# Patient Record
Sex: FEMALE | Race: WHITE
[De-identification: names, ages, dates, MRNs, and addresses within clinical notes are randomized per-mention and may not be internally consistent; named-entity substitution may affect disease eponyms.]

---

## 2023-06-23 ENCOUNTER — HOSPITAL ENCOUNTER (EMERGENCY)
Age: 33
Discharge: HOME | End: 2023-06-23
Payer: MEDICAID

## 2023-06-23 VITALS
DIASTOLIC BLOOD PRESSURE: 66 MMHG | RESPIRATION RATE: 20 BRPM | OXYGEN SATURATION: 99 % | SYSTOLIC BLOOD PRESSURE: 92 MMHG | HEART RATE: 73 BPM

## 2023-06-23 VITALS — BODY MASS INDEX: 24.9 KG/M2

## 2023-06-23 DIAGNOSIS — F17.210: ICD-10-CM

## 2023-06-23 DIAGNOSIS — K08.89: ICD-10-CM

## 2023-06-23 DIAGNOSIS — K02.9: Primary | ICD-10-CM

## 2023-06-23 PROCEDURE — 99283 EMERGENCY DEPT VISIT LOW MDM: CPT

## 2023-06-23 NOTE — ED_ITS
HPI - General Adult    
General    
Chief complaint: Dental/Oral    
Stated complaint: DENTAL PAIN    
Time Seen by Provider: 06/23/23 19:06    
Source: patient    
Mode of arrival: walk-in    
Limitations: no limitations    
History of Present Illness    
HPI narrative:     
32-year-old presents with dental pain left posterior tooth. Patient has chronic   
decay and dental caries. Chronic pain noted to dentition #17. No acute abscess   
appreciated. States pain radiates to her ear. She is afebrile.    
Related Data    
                                  Previous Rx's    
    
    
    
 Medication  Instructions  Recorded    
     
clindamycin HCl 300 mg capsule 300 mg PO BID 7 days #14 caps 06/23/23    
     
ibuprofen 600 mg tablet 600 mg PO Q8H PRN fever or pain 06/23/23    
    
 #20 tabs     
    
    
    
                                    Allergies    
    
    
    
Allergy/AdvReac Type Severity Reaction Status Date / Time    
     
No Known Drug Allergies Allergy   Verified 06/23/23 19:21    
    
    
    
    
Review of Systems    
    
    
ROS      
    
 Narrative All Systems are negative except as noted/marked.All systems reviewed   
and otherwise negative       
    
    
PFSH    
PFSH    
Social History    
Smoking status:  Current every day smoker     
    
    
    
Exam    
Narrative    
Exam Narrative:     
N    
    
General: The patient is comfortable, alert and oriented x3, well appearing, non   
toxic in no apparent distress.    
Head: Atraumatic and normocephalic.    
Eyes: Normal conjunctiva, no exudates.    
ENT: The oropharynx is normal. No pharyngeal erythema, uvular edema, tonsillar   
exudates, asymmetry or trismus. Uvula is midline.  Mouth is normal to inspection  
 With the exception of a pain on percussion of the tooth #17 and evidence of   
dental caries. There is no evidence of facial asymmetry or abscess formation.   
Floor of the mouth is soft. No tenderness in the submental or submandibular   
space. No tongue elevation or deviation. The patient has no evidence of   
periapical abscess, gingivitis, ANUG or other acute pathology.  Airway is   
patent.    
Neck: The neck demonstrates normal range of motion.  No meningeals signs are   
present. No stridor.  No masses or lymphandenopathy noted.    
Respiratory: No acute distress, lungs are clear to auscultation, no wheezing,   
rhonchi, or rales noted. No stridor or retractions are noted.    
Cardiovascular: Regular rate and rhythm    
Skin: The skin exam shows no evidence of rashes    
Neuro: Alert and oriented x4, normal speech    
Lymphatic: No cervical lymphadenopathy     
    
Constitutional    
    
                               Vital Signs - 24 hr    
    
    
    
 06/23/23    
19:17    
     
Pulse Rate [Monitor] 73    
     
Respiratory Rate 20    
     
Blood Pressure [Left Arm] 92/66    
     
Pulse Oximetry 99    
     
Oxygen Delivery Method Room Air    
    
    
    
    
    
Course    
Vital Signs    
Vital signs:     
    
                                   Vital Signs    
    
    
    
Pulse Rate  73   06/23/23 19:17    
     
Respiratory Rate  20   06/23/23 19:17    
     
Blood Pressure  92/66   06/23/23 19:17    
     
Pulse Oximetry  99   06/23/23 19:17    
     
Oxygen Delivery Method  Room Air  06/23/23 19:17    
    
    
                                            
    
    
    
Pulse Rate  73   06/23/23 19:17    
     
Respiratory Rate  20   06/23/23 19:17    
     
Blood Pressure  92/66   06/23/23 19:17    
     
Pulse Oximetry  99   06/23/23 19:17    
     
Oxygen Delivery Method  Room Air  06/23/23 19:17    
    
    
    
    
    
Medical Decision Making    
MDM Narrative    
Medical decision making narrative:     
Patient's examination is consistent with dental caries, dental pain discharged   
home prescription of antibiotics and Motrin. Follow-up with primary care   
physician or dentist. Given dental list.    
Differential Diagnosis    
Differential Diagnosis: Pain, dental abscess, dental caries    
Medical Records    
Medical records reviewed: Yes I reviewed the patient's medical records    
    
Discharge Plan    
Discharge    
Chief Complaint: Dental/Oral    
    
Clinical Impression:    
 Dental caries, Toothache    
    
    
Patient Disposition: Home, Self-Care    
    
Time of Disposition Decision: 19:24    
    
Condition: Good    
    
Prescriptions / Home Meds:    
New    
  clindamycin HCl 300 mg capsule     
   300 mg PO BID 7 Days Qty: 14 0RF    
  ibuprofen 600 mg tablet     
   600 mg PO Q8H PRN (Reason: fever or pain) Qty: 20 0RF    
    
Instructions:  Toothache (ED)    
    
Stand Alone Forms:  Portal Instructions    
    
Referrals:    
JULI SANTIAGO [Primary Care Provider] - 1 week    
    
Discharge Date/Time: 06/23/23 20:04

## 2023-10-19 ENCOUNTER — APPOINTMENT (OUTPATIENT)
Dept: GENERAL RADIOLOGY | Age: 33
End: 2023-10-19
Payer: MEDICAID

## 2023-10-19 ENCOUNTER — HOSPITAL ENCOUNTER (EMERGENCY)
Age: 33
Discharge: HOME | End: 2023-10-19
Attending: EMERGENCY MEDICINE
Payer: MEDICAID

## 2023-10-19 VITALS
WEIGHT: 149.5 LBS | DIASTOLIC BLOOD PRESSURE: 82 MMHG | OXYGEN SATURATION: 99 % | RESPIRATION RATE: 22 BRPM | SYSTOLIC BLOOD PRESSURE: 111 MMHG | HEART RATE: 120 BPM | HEIGHT: 68 IN | BODY MASS INDEX: 22.66 KG/M2 | TEMPERATURE: 98 F

## 2023-10-19 DIAGNOSIS — R07.81 PLEURODYNIA: Primary | ICD-10-CM

## 2023-10-19 LAB
ANION GAP SERPL CALCULATED.3IONS-SCNC: 13 MMOL/L (ref 9–17)
BASOPHILS # BLD: 0.03 K/UL (ref 0–0.2)
BASOPHILS NFR BLD: 0 % (ref 0–2)
BUN SERPL-MCNC: 14 MG/DL (ref 6–20)
BUN/CREAT SERPL: 20 (ref 9–20)
CALCIUM SERPL-MCNC: 9.5 MG/DL (ref 8.6–10.4)
CHLORIDE SERPL-SCNC: 102 MMOL/L (ref 98–107)
CO2 SERPL-SCNC: 25 MMOL/L (ref 20–31)
CREAT SERPL-MCNC: 0.7 MG/DL (ref 0.5–0.9)
D DIMER PPP FEU-MCNC: <0.27 UG/ML FEU (ref 0–0.59)
EOSINOPHIL # BLD: 0.1 K/UL (ref 0–0.4)
EOSINOPHILS RELATIVE PERCENT: 1 % (ref 0–5)
ERYTHROCYTE [DISTWIDTH] IN BLOOD BY AUTOMATED COUNT: 12.3 % (ref 12.1–15.2)
GFR SERPL CREATININE-BSD FRML MDRD: >60 ML/MIN/1.73M2
GLUCOSE SERPL-MCNC: 115 MG/DL (ref 70–99)
HCT VFR BLD AUTO: 39.1 % (ref 36–46)
HGB BLD-MCNC: 14.2 G/DL (ref 12–16)
IMM GRANULOCYTES # BLD AUTO: 0.01 K/UL (ref 0–0.3)
IMM GRANULOCYTES NFR BLD: 0 % (ref 0–5)
LYMPHOCYTES NFR BLD: 2.88 K/UL (ref 1–4.8)
LYMPHOCYTES RELATIVE PERCENT: 31 % (ref 15–40)
MCH RBC QN AUTO: 30.3 PG (ref 26–34)
MCHC RBC AUTO-ENTMCNC: 36.3 G/DL (ref 31–37)
MCV RBC AUTO: 83.4 FL (ref 80–100)
MONOCYTES NFR BLD: 0.81 K/UL (ref 0–1)
MONOCYTES NFR BLD: 9 % (ref 4–8)
NEUTROPHILS NFR BLD: 59 % (ref 47–75)
NEUTS SEG NFR BLD: 5.6 K/UL (ref 2.5–7)
PLATELET # BLD AUTO: 225 K/UL (ref 140–450)
PMV BLD AUTO: 10.2 FL (ref 6–12)
POTASSIUM SERPL-SCNC: 3.6 MMOL/L (ref 3.7–5.3)
RBC # BLD AUTO: 4.69 M/UL (ref 4–5.2)
SODIUM SERPL-SCNC: 140 MMOL/L (ref 135–144)
TROPONIN I SERPL HS-MCNC: <6 NG/L (ref 0–14)
WBC OTHER # BLD: 9.4 K/UL (ref 3.5–11)

## 2023-10-19 PROCEDURE — 6360000002 HC RX W HCPCS: Performed by: EMERGENCY MEDICINE

## 2023-10-19 PROCEDURE — 99285 EMERGENCY DEPT VISIT HI MDM: CPT

## 2023-10-19 PROCEDURE — 94640 AIRWAY INHALATION TREATMENT: CPT

## 2023-10-19 PROCEDURE — 71045 X-RAY EXAM CHEST 1 VIEW: CPT

## 2023-10-19 PROCEDURE — 94664 DEMO&/EVAL PT USE INHALER: CPT

## 2023-10-19 PROCEDURE — 6370000000 HC RX 637 (ALT 250 FOR IP): Performed by: EMERGENCY MEDICINE

## 2023-10-19 PROCEDURE — 80048 BASIC METABOLIC PNL TOTAL CA: CPT

## 2023-10-19 PROCEDURE — 84484 ASSAY OF TROPONIN QUANT: CPT

## 2023-10-19 PROCEDURE — 93005 ELECTROCARDIOGRAM TRACING: CPT | Performed by: EMERGENCY MEDICINE

## 2023-10-19 PROCEDURE — 85025 COMPLETE CBC W/AUTO DIFF WBC: CPT

## 2023-10-19 PROCEDURE — 85379 FIBRIN DEGRADATION QUANT: CPT

## 2023-10-19 RX ORDER — BUSPIRONE HYDROCHLORIDE 30 MG/1
30 TABLET ORAL DAILY
COMMUNITY

## 2023-10-19 RX ORDER — ALBUTEROL SULFATE 2.5 MG/3ML
2.5 SOLUTION RESPIRATORY (INHALATION) ONCE
Status: COMPLETED | OUTPATIENT
Start: 2023-10-19 | End: 2023-10-19

## 2023-10-19 RX ORDER — BUPROPION HYDROCHLORIDE 300 MG/1
300 TABLET ORAL 3 TIMES DAILY
COMMUNITY

## 2023-10-19 RX ORDER — PANTOPRAZOLE SODIUM 20 MG/1
20 TABLET, DELAYED RELEASE ORAL DAILY
Qty: 30 TABLET | Refills: 0 | Status: SHIPPED | OUTPATIENT
Start: 2023-10-19

## 2023-10-19 RX ADMIN — ALBUTEROL SULFATE 2.5 MG: 2.5 SOLUTION RESPIRATORY (INHALATION) at 19:11

## 2023-10-19 RX ADMIN — Medication: at 19:24

## 2023-10-19 ASSESSMENT — PAIN SCALES - GENERAL: PAINLEVEL_OUTOF10: 10

## 2023-10-19 ASSESSMENT — PAIN DESCRIPTION - ORIENTATION: ORIENTATION: MID

## 2023-10-19 ASSESSMENT — LIFESTYLE VARIABLES
HOW OFTEN DO YOU HAVE A DRINK CONTAINING ALCOHOL: NEVER
HOW MANY STANDARD DRINKS CONTAINING ALCOHOL DO YOU HAVE ON A TYPICAL DAY: PATIENT DOES NOT DRINK

## 2023-10-19 ASSESSMENT — PAIN - FUNCTIONAL ASSESSMENT: PAIN_FUNCTIONAL_ASSESSMENT: 0-10

## 2023-10-19 ASSESSMENT — PAIN DESCRIPTION - LOCATION: LOCATION: CHEST

## 2023-10-19 ASSESSMENT — PAIN DESCRIPTION - DESCRIPTORS: DESCRIPTORS: SHARP

## 2023-10-19 ASSESSMENT — PAIN DESCRIPTION - FREQUENCY: FREQUENCY: CONTINUOUS

## 2023-10-19 ASSESSMENT — PAIN DESCRIPTION - PAIN TYPE: TYPE: ACUTE PAIN

## 2023-10-19 NOTE — ED PROVIDER NOTES
[address] [facility],[address] COMPLAINT    Chief Complaint   Patient presents with    Chest Pain     Chest pain, SOB x2 days \"I feel like my throat is closing\" \"I have been cleaning black mold and I have asthma\"        HPI    Natalio Jim is a 28 y.o. female who presentsto ED with chest pain started 2 days ago. Patient states that she has chest pain worse with deep inspiration. Patient states that she has been \"cleaning black mold\"  Patient also has anxiety  PAST MEDICAL HISTORY    Past Medical History:   Diagnosis Date    Asthma        SURGICAL HISTORY    History reviewed. No pertinent surgical history. CURRENT MEDICATIONS    Current Outpatient Rx   Medication Sig Dispense Refill    busPIRone (BUSPAR) 30 MG tablet Take 30 mg by mouth daily      buPROPion (WELLBUTRIN XL) 300 MG extended release tablet Take 1 tablet by mouth in the morning, at noon, and at bedtime      pantoprazole (PROTONIX) 20 MG tablet Take 1 tablet by mouth daily 30 tablet 0       ALLERGIES    No Known Allergies    FAMILY HISTORY    History reviewed. No pertinent family history.     SOCIAL HISTORY    Social History     Socioeconomic History    Marital status:      Spouse name: None    Number of children: None    Years of education: None    Highest education level: None   Tobacco Use    Smoking status: Every Day     Packs/day: 1     Types: Cigarettes    Smokeless tobacco: Former   Substance and Sexual Activity    Alcohol use: Not Currently    Drug use: Not Currently     Types: IV           Review of Systems:  Constitutional:  Denies fever, chills, weight loss or weakness   Eyes:  Denies photophobia or discharge   HENT:  Denies sore throat or ear pain   Respiratory:  Denies cough or shortness of breath   Cardiovascular:  Denies chest pain, palpitations or swelling   GI:  Denies abdominal pain, nausea, vomiting, or diarrhea   Musculoskeletal:  Denies back pain   Skin:  Denies rash   Neurologic:  Denies headache, focal

## 2023-10-20 LAB
EKG ATRIAL RATE: 103 BPM
EKG P AXIS: 59 DEGREES
EKG P-R INTERVAL: 152 MS
EKG Q-T INTERVAL: 328 MS
EKG QRS DURATION: 90 MS
EKG QTC CALCULATION (BAZETT): 429 MS
EKG R AXIS: 69 DEGREES
EKG T AXIS: 39 DEGREES
EKG VENTRICULAR RATE: 103 BPM

## 2023-10-20 PROCEDURE — 93010 ELECTROCARDIOGRAM REPORT: CPT | Performed by: INTERNAL MEDICINE

## 2023-11-01 ENCOUNTER — HOSPITAL ENCOUNTER (EMERGENCY)
Age: 33
Discharge: HOME | End: 2023-11-01
Attending: EMERGENCY MEDICINE
Payer: MEDICAID

## 2023-11-01 VITALS
SYSTOLIC BLOOD PRESSURE: 97 MMHG | BODY MASS INDEX: 22.91 KG/M2 | HEART RATE: 78 BPM | TEMPERATURE: 98.2 F | RESPIRATION RATE: 16 BRPM | DIASTOLIC BLOOD PRESSURE: 69 MMHG | OXYGEN SATURATION: 99 % | HEIGHT: 68 IN | WEIGHT: 151.2 LBS

## 2023-11-01 DIAGNOSIS — R51.9 ACUTE NONINTRACTABLE HEADACHE, UNSPECIFIED HEADACHE TYPE: Primary | ICD-10-CM

## 2023-11-01 PROCEDURE — 99284 EMERGENCY DEPT VISIT MOD MDM: CPT

## 2023-11-01 PROCEDURE — 6360000002 HC RX W HCPCS: Performed by: EMERGENCY MEDICINE

## 2023-11-01 PROCEDURE — 96372 THER/PROPH/DIAG INJ SC/IM: CPT

## 2023-11-01 RX ORDER — DIPHENHYDRAMINE HYDROCHLORIDE 50 MG/ML
25 INJECTION INTRAMUSCULAR; INTRAVENOUS ONCE
Status: COMPLETED | OUTPATIENT
Start: 2023-11-01 | End: 2023-11-01

## 2023-11-01 RX ORDER — KETOROLAC TROMETHAMINE 30 MG/ML
30 INJECTION, SOLUTION INTRAMUSCULAR; INTRAVENOUS ONCE
Status: COMPLETED | OUTPATIENT
Start: 2023-11-01 | End: 2023-11-01

## 2023-11-01 RX ORDER — PROCHLORPERAZINE EDISYLATE 5 MG/ML
10 INJECTION INTRAMUSCULAR; INTRAVENOUS ONCE
Status: COMPLETED | OUTPATIENT
Start: 2023-11-01 | End: 2023-11-01

## 2023-11-01 RX ADMIN — KETOROLAC TROMETHAMINE 30 MG: 30 INJECTION, SOLUTION INTRAMUSCULAR; INTRAVENOUS at 23:00

## 2023-11-01 RX ADMIN — DIPHENHYDRAMINE HYDROCHLORIDE 25 MG: 50 INJECTION INTRAMUSCULAR; INTRAVENOUS at 23:02

## 2023-11-01 RX ADMIN — PROCHLORPERAZINE EDISYLATE 10 MG: 5 INJECTION INTRAMUSCULAR; INTRAVENOUS at 23:01

## 2023-11-01 ASSESSMENT — PAIN - FUNCTIONAL ASSESSMENT: PAIN_FUNCTIONAL_ASSESSMENT: 0-10

## 2023-11-01 ASSESSMENT — LIFESTYLE VARIABLES
HOW MANY STANDARD DRINKS CONTAINING ALCOHOL DO YOU HAVE ON A TYPICAL DAY: PATIENT DOES NOT DRINK
HOW OFTEN DO YOU HAVE A DRINK CONTAINING ALCOHOL: NEVER

## 2023-11-01 ASSESSMENT — PAIN SCALES - GENERAL: PAINLEVEL_OUTOF10: 5

## 2023-11-01 ASSESSMENT — PAIN DESCRIPTION - DESCRIPTORS: DESCRIPTORS: ACHING

## 2023-11-01 ASSESSMENT — PAIN DESCRIPTION - LOCATION: LOCATION: HEAD

## 2024-12-18 ENCOUNTER — HOSPITAL ENCOUNTER (EMERGENCY)
Age: 34
Discharge: HOME | End: 2024-12-18
Payer: MEDICAID

## 2024-12-18 VITALS — SYSTOLIC BLOOD PRESSURE: 111 MMHG | DIASTOLIC BLOOD PRESSURE: 88 MMHG

## 2024-12-18 VITALS — SYSTOLIC BLOOD PRESSURE: 127 MMHG | DIASTOLIC BLOOD PRESSURE: 78 MMHG

## 2024-12-18 VITALS — HEART RATE: 93 BPM

## 2024-12-18 VITALS — BODY MASS INDEX: 20.4 KG/M2

## 2024-12-18 VITALS — SYSTOLIC BLOOD PRESSURE: 111 MMHG | DIASTOLIC BLOOD PRESSURE: 71 MMHG

## 2024-12-18 VITALS
HEART RATE: 99 BPM | SYSTOLIC BLOOD PRESSURE: 137 MMHG | OXYGEN SATURATION: 100 % | DIASTOLIC BLOOD PRESSURE: 105 MMHG | TEMPERATURE: 98.1 F

## 2024-12-18 VITALS — OXYGEN SATURATION: 97 % | HEART RATE: 93 BPM

## 2024-12-18 VITALS — SYSTOLIC BLOOD PRESSURE: 161 MMHG | DIASTOLIC BLOOD PRESSURE: 88 MMHG

## 2024-12-18 DIAGNOSIS — J45.901: Primary | ICD-10-CM

## 2024-12-18 DIAGNOSIS — F17.200: ICD-10-CM

## 2024-12-18 PROCEDURE — 87811 SARS-COV-2 COVID19 W/OPTIC: CPT

## 2024-12-18 PROCEDURE — 93005 ELECTROCARDIOGRAM TRACING: CPT

## 2024-12-18 PROCEDURE — 99284 EMERGENCY DEPT VISIT MOD MDM: CPT

## 2024-12-18 PROCEDURE — 71046 X-RAY EXAM CHEST 2 VIEWS: CPT

## 2024-12-18 PROCEDURE — 87804 INFLUENZA ASSAY W/OPTIC: CPT

## 2024-12-18 PROCEDURE — 94640 AIRWAY INHALATION TREATMENT: CPT

## 2024-12-18 NOTE — XR_ITS
The [address] [address] 
     [phone] 
  
  
Patient Name: 
JAVIER KENNEDY 
  
MRN: [id number]    YOB: 1990    Sex: F 
Assigned Patient Location: ED.MAIN 
Current Patient Location: ER 
Accession/Order Number: [id number] 
Exam Date: 12/18/2024  05:58    Report Date: 12/18/2024  06:09 
  
At the request of: 
JOE GUTIERREZ   
  
Procedure:  XR chest 2V 
  
EXAM: XR chest 2V  
  
HISTORY: cough 
  
COMPARISON: Portable chest radiograph dated 10/19/2023.  
  
TECHNIQUE: PA and lateral views of the chest performed. 
  
FINDINGS:  
The trachea is midline. The heart size is within normal limits. There is no  
consolidation, pleural effusion or pulmonary vascular congestion. There is no  
pneumothorax or osseous abnormality. 
  
ORDER #: [id number] XR/XR chest 2V  
IMPRESSION:   
   
Unremarkable PA and lateral views of the chest.  
   
   
Electronically authenticated by: MIGUEL LANDA   Date: 12/18/2024  06:09

## 2024-12-18 NOTE — ED_ITS
HPI    
HPI - General Adult    
General    
Chief complaint: Shortness of Breath/Dyspnea    
Stated complaint: CHEST PAIN, SOB    
Time Seen by Provider: 12/18/24 04:14    
Source: patient    
Mode of arrival: walk-in    
Limitations: no limitations    
History of Present Illness    
HPI narrative:     
34-year-old female to the emergency department with chief complaint of cough,   
shortness of breath.  She reports that she has a history of asthma.  She reports  
that there has been a cough and nasal congestion illness going around her home.   
Started with her  and is now spread to her.  She reports that she lost   
her nebulizer due to moving.  She has some increasing shortness of breath   
tonight prompting her ED visit.    
Related Data    
                                Home Medications    
    
    
    
?Medication ?Instructions ?Recorded ?Confirmed    
     
albuterol  12/18/24     
    
    
                                  Previous Rx's    
    
    
    
?Medication ?Instructions ?Recorded    
     
brompheniramine-pseudoephedrine-DM 5 ml PO Q4H PRN cold symptoms #118 12/18/24    
    
2 mg-30 mg-10 mg/5 mL oral syrup mL     
    
(Bromfed DM)      
     
prednisone 20 mg tablet 60 mg (3 x 20 mg) PO DAILY 5 days 12/18/24    
    
 #15 tabs     
    
    
    
                                    Allergies    
    
    
    
Allergy/AdvReac Type Severity Reaction Status Date / Time    
     
No Known Drug Allergies Allergy   Verified 06/23/23 19:21    
    
    
    
    
Opioid HPI    
Opioid Management    
Most Recent Opioid Data:     
    
    
                                        No Data to Display    
    
    
    
Review of Systems    
    
    
ROS      
    
 Status of ROS                          10 or more systems reviewed and unremark    
able except as noted in     
history and below       
    
    
PFSH    
PFSH    
Social History    
Smoking status:  Current every day smoker     
Little interest or pleasure in doing things:  not at all     
Feeling down, depressed, or hopeless:  not at all     
    
    
    
Exam    
Narrative    
Exam Narrative:     
VITALS: I have reviewed the triage vital signs.     
GENERAL: Well developed, well appearing adult in no acute distress.      
NEURO: Alert and oriented. Moves all extremities. Face is symmetric and   
expressive.     
EYES: PERRL. No scleral icterus or conjunctival injection. No discharge.     
HENT: Normocephalic, atraumatic. Hearing is grossly intact. Nares grossly patent  
and without discharge. Mucous membranes moist.     
NECK: No JVD. Patient moves neck without restriction.     
CARDIO: Rhythm regular. Normal rate. No murmur, rub, or gallop. Pulses equal   
bilaterally in the upper and lower extremity. No lower extremity edema.     
PULM: Rhonchi that clear with coughing.  Wheezing.  No conversational dyspnea.   
No splinting, stridor, or accessory muscle use.      
GI/: Abdomen is soft and non-tender. Normoactive bowel sounds.     
EXTREMITIES: Symmetric muscle bulk. No joint swelling. No clubbing, cyanosis, or  
deformity.     
SKIN: Warm and dry. Normal turgor. No rash or lesions appreciated.     
PSYCH: Mood, affect, and interaction is appropriate to the setting.    
    
Constitutional    
Vital Signs, click to edit/add:     
    
                                Last Vital Signs    
    
    
    
Temp  98.1 F   12/18/24 04:15    
     
Pulse  93 H  12/18/24 04:41    
     
Resp  23 H  12/18/24 04:41    
     
BP  111/88   12/18/24 06:00    
     
Pulse Ox  97   12/18/24 04:39    
     
O2 Del Method  Room Air  12/18/24 04:39    
    
    
    
    
    
Course    
Vital Signs    
Vital signs:     
    
                                   Vital Signs    
    
    
    
Temperature  98.1 F   12/18/24 04:15    
     
Pulse Rate  99 H  12/18/24 04:15    
     
Respiratory Rate  20   12/18/24 04:15    
     
Blood Pressure  137/105 H  12/18/24 04:15    
     
Pulse Oximetry  100   12/18/24 04:15    
     
Oxygen Delivery Method  Room Air  12/18/24 04:15    
    
    
                                            
    
    
    
Temperature  98.1 F   12/18/24 04:15    
     
Pulse Rate  93 H  12/18/24 04:41    
     
Respiratory Rate  23 H  12/18/24 04:41    
     
Blood Pressure  111/88   12/18/24 06:00    
     
Pulse Oximetry  97   12/18/24 04:39    
     
Oxygen Delivery Method  Room Air  12/18/24 04:39    
    
    
    
    
    
Medical Decision Making    
MDM Narrative    
Medical decision making narrative:     
34-year-old female to the emergency department with chief complaint of cough.    
Vital stable, the patient is afebrile.  Wheezing and rhonchi on exam.  Will   
treat with DuoNeb, prednisone.  Chest x-ray and EKG to be obtained.    
    
EKG without evidence of ischemia.  Chest x-ray without acute findings.    
    
Patient felt much improved after the breathing treatment.  She had no further   
wheezing.  She is given albuterol inhaler for home.  Prednisone prescription.    
Return precautions were discussed.  All questions were answered.  Patient was   
discharged home with diagnosis of acute asthma exacerbation.    
Medical Records    
Medical records reviewed: Yes I reviewed the patient's medical records    
Imaging Data    
Chest x-ray:     
      Radiologist's impression:     
    
ITS Impressions    
    
Chest X-Ray  12/18/24 04:21    
IMPRESSION:     
     
Unremarkable PA and lateral views of the chest.    
     
     
Electronically authenticated by: MIGUEL LANDA   Date: 12/18/2024  06:09    
    
    
    
    
ECG Data    
Attestation: I personally reviewed and interpreted this ECG as follows: (Normal   
sinus rhythm at a rate of 93.  No STEMI.  Normal QTc.)    
    
Discharge Plan    
Discharge    
Chief Complaint: Shortness of Breath/Dyspnea    
    
Clinical Impression:    
 Asthma with acute exacerbation    
    
    
Patient Disposition: Home, Self-Care    
    
Time of Disposition Decision: 06:22    
    
Condition: Good    
    
Mode of Transportation: Private Vehicle    
    
Prescriptions / Home Meds:    
New    
  prednisone 20 mg tablet     
   60 mg PO DAILY 5 Days Qty: 15 0RF    
  brompheniramine-pseudoeph-DM [Bromfed DM] 2-30-10 mg/5 mL syrup     
   5 ml PO Q4H PRN (Reason: cold symptoms) Qty: 118 0RF    
    
No Action    
  albuterol       
           
    
Print Language: English    
    
Instructions:  Asthma (ED), How to Use a Metered-Dose Inhaler and a Spacer (ED)    
    
Additional Instructions:    
    
Call the office of your primary care doctor to arrange for follow-up within the   
above-stated timeframe. Your ED visit was focused on your acute issue and does   
not replace primary care. You should review your labs, imaging, and diagnoses   
from this ED visit with your primary care physician. There may be non-emergent/   
incidental findings that need further evaluation.  You should review your vital   
signs including blood pressure with your PCP. If you were prescribed medications  
you should discuss possible side-effects and drug interactions with your   
pharmacist.     
    
Call 911 or go to the nearest Emergency Department if you develop any new or   
worsening symptoms.    
    
Seek immediate medical attention if you develop: worsening shortness of breath,   
difficulty breathing, chest pain, nausea, vomiting, weakness, numbness,   
tingling, excessive sweating, loss of motion in your arms or legs, or any new or  
worsening symptoms.    
    
    
Referrals:    
JULI SANTIAGO [Primary Care Provider] - 1 week

## 2024-12-18 NOTE — ED.GENADUL1
HPI
HPI - General Adult
General
Chief complaint: Shortness of Breath/Dyspnea
Stated complaint: CHEST PAIN, SOB
Time Seen by Provider: 12/18/24 04:14
Source: patient
Mode of arrival: walk-in
Limitations: no limitations
History of Present Illness
HPI narrative: 
34-year-old female to the emergency department with chief complaint of cough, shortness of breath.  She reports that she has a history of asthma.  She reports that there has been a cough and nasal congestion illness going around her home.  Started 
with her  and is now spread to her.  She reports that she lost her nebulizer due to moving.  She has some increasing shortness of breath tonight prompting her ED visit.
Related Data
Home Medications

?Medication ?Instructions ?Recorded ?Confirmed
albuterol  12/18/24 

Previous Rx's

?Medication ?Instructions ?Recorded
brompheniramine-pseudoephedrine-DM 5 ml PO Q4H PRN cold symptoms #118 12/18/24
2 mg-30 mg-10 mg/5 mL oral syrup mL 
(Bromfed DM)  
prednisone 20 mg tablet 60 mg (3 x 20 mg) PO DAILY 5 days 12/18/24
 #15 tabs 


Allergies

Allergy/AdvReac Type Severity Reaction Status Date / Time
No Known Drug Allergies Allergy   Verified 06/23/23 19:21



Opioid HPI
Opioid Management
Most Recent Opioid Data: 
      No Data to Display


Review of Systems
ROS  
 Status of ROS 10 or more systems reviewed and unremarkable except as noted in history and below   

PFSH
PFSH
Social History
Smoking status:  Current every day smoker 
Little interest or pleasure in doing things:  not at all 
Feeling down, depressed, or hopeless:  not at all 



Exam
Narrative
Exam Narrative: 
VITALS: I have reviewed the triage vital signs. 
GENERAL: Well developed, well appearing adult in no acute distress.  
NEURO: Alert and oriented. Moves all extremities. Face is symmetric and expressive. 
EYES: PERRL. No scleral icterus or conjunctival injection. No discharge. 
HENT: Normocephalic, atraumatic. Hearing is grossly intact. Nares grossly patent and without discharge. Mucous membranes moist. 
NECK: No JVD. Patient moves neck without restriction. 
CARDIO: Rhythm regular. Normal rate. No murmur, rub, or gallop. Pulses equal bilaterally in the upper and lower extremity. No lower extremity edema. 
PULM: Rhonchi that clear with coughing.  Wheezing.  No conversational dyspnea. No splinting, stridor, or accessory muscle use.  
GI/: Abdomen is soft and non-tender. Normoactive bowel sounds. 
EXTREMITIES: Symmetric muscle bulk. No joint swelling. No clubbing, cyanosis, or deformity. 
SKIN: Warm and dry. Normal turgor. No rash or lesions appreciated. 
PSYCH: Mood, affect, and interaction is appropriate to the setting.

Constitutional
Vital Signs, click to edit/add: 

Last Vital Signs

Temp  98.1 F   12/18/24 04:15
Pulse  93 H  12/18/24 04:41
Resp  23 H  12/18/24 04:41
BP  111/88   12/18/24 06:00
Pulse Ox  97   12/18/24 04:39
O2 Del Method  Room Air  12/18/24 04:39




Course
Vital Signs
Vital signs: 

Vital Signs

Temperature  98.1 F   12/18/24 04:15
Pulse Rate  99 H  12/18/24 04:15
Respiratory Rate  20   12/18/24 04:15
Blood Pressure  137/105 H  12/18/24 04:15
Pulse Oximetry  100   12/18/24 04:15
Oxygen Delivery Method  Room Air  12/18/24 04:15



Temperature  98.1 F   12/18/24 04:15
Pulse Rate  93 H  12/18/24 04:41
Respiratory Rate  23 H  12/18/24 04:41
Blood Pressure  111/88   12/18/24 06:00
Pulse Oximetry  97   12/18/24 04:39
Oxygen Delivery Method  Room Air  12/18/24 04:39




Medical Decision Making
MDM Narrative
Medical decision making narrative: 
34-year-old female to the emergency department with chief complaint of cough.  Vital stable, the patient is afebrile.  Wheezing and rhonchi on exam.  Will treat with DuoNeb, prednisone.  Chest x-ray and EKG to be obtained.

EKG without evidence of ischemia.  Chest x-ray without acute findings.

Patient felt much improved after the breathing treatment.  She had no further wheezing.  She is given albuterol inhaler for home.  Prednisone prescription.  Return precautions were discussed.  All questions were answered.  Patient was discharged 
home with diagnosis of acute asthma exacerbation.
Medical Records
Medical records reviewed: Yes I reviewed the patient's medical records
Imaging Data
Chest x-ray: 
      Radiologist's impression: 

ITS Impressions

Chest X-Ray  12/18/24 04:21
IMPRESSION: 
 
Unremarkable PA and lateral views of the chest.
 
 
Electronically authenticated by: MIGUEL LANDA   Date: 12/18/2024  06:09




ECG Data
Attestation: I personally reviewed and interpreted this ECG as follows: (Normal sinus rhythm at a rate of 93.  No STEMI.  Normal QTc.)

Discharge Plan
Discharge
Chief Complaint: Shortness of Breath/Dyspnea

Clinical Impression:
 Asthma with acute exacerbation


Patient Disposition: Home, Self-Care

Time of Disposition Decision: 06:22

Condition: Good

Mode of Transportation: Private Vehicle

Prescriptions / Home Meds:
New
  prednisone 20 mg tablet 
   60 mg PO DAILY 5 Days Qty: 15 0RF
  brompheniramine-pseudoeph-DM [Bromfed DM] 2-30-10 mg/5 mL syrup 
   5 ml PO Q4H PRN (Reason: cold symptoms) Qty: 118 0RF

No Action
  albuterol   
       

Print Language: English

Instructions:  Asthma (ED), How to Use a Metered-Dose Inhaler and a Spacer (ED)

Additional Instructions:

Call the office of your primary care doctor to arrange for follow-up within the above-stated timeframe. Your ED visit was focused on your acute issue and does not replace primary care. You should review your labs, imaging, and diagnoses from this ED 
visit with your primary care physician. There may be non-emergent/ incidental findings that need further evaluation.  You should review your vital signs including blood pressure with your PCP. If you were prescribed medications you should discuss 
possible side-effects and drug interactions with your pharmacist. 

Call 911 or go to the nearest Emergency Department if you develop any new or worsening symptoms.

Seek immediate medical attention if you develop: worsening shortness of breath, difficulty breathing, chest pain, nausea, vomiting, weakness, numbness, tingling, excessive sweating, loss of motion in your arms or legs, or any new or worsening 
symptoms.


Referrals:
JULI SANTIAGO [Primary Care Provider] - 1 week

## 2024-12-18 NOTE — ECG_ITS
[facility] 
                                        
                                       Test Date:    2024 
Pat Name:     JAVIER KENNEDY             Department:    
Patient ID:   [id number]               Room:         - 
Gender:       Female                   Technician:    
:          [id number]               Requested By: [id number] 
Order Number: [id number]              Reading MD:   MARY SAENZ 
                                 Measurements 
Intervals                              Axis           
Rate:         93                       P:            56 
AL:           166                      QRS:          74 
QRSD:         86                       T:            58 
QT:           354                                     
QTc:          405                                     
                           Interpretive Statements 
1100 Sinus rhythm 
9150 **  abnormal ECG  ** 
Electronically Signed On 2024 6:50:03 EST by MARY SAENZ

## 2025-05-23 ENCOUNTER — HOSPITAL ENCOUNTER (EMERGENCY)
Age: 35
Discharge: HOME | End: 2025-05-23
Payer: MEDICAID

## 2025-05-23 VITALS
HEART RATE: 110 BPM | TEMPERATURE: 97.7 F | OXYGEN SATURATION: 98 % | SYSTOLIC BLOOD PRESSURE: 109 MMHG | DIASTOLIC BLOOD PRESSURE: 81 MMHG | BODY MASS INDEX: 22.8 KG/M2

## 2025-05-23 DIAGNOSIS — Z98.51: ICD-10-CM

## 2025-05-23 DIAGNOSIS — M54.50: Primary | ICD-10-CM

## 2025-05-23 DIAGNOSIS — B19.20: ICD-10-CM

## 2025-05-23 DIAGNOSIS — F17.200: ICD-10-CM

## 2025-05-23 PROCEDURE — 99281 EMR DPT VST MAYX REQ PHY/QHP: CPT

## 2025-05-27 ENCOUNTER — HOSPITAL ENCOUNTER
Dept: HOSPITAL 101 - LAB | Age: 35
Discharge: HOME | End: 2025-05-27
Payer: MEDICAID

## 2025-05-27 DIAGNOSIS — Z32.01: Primary | ICD-10-CM

## 2025-05-27 PROCEDURE — 84702 CHORIONIC GONADOTROPIN TEST: CPT

## 2025-05-27 PROCEDURE — 36415 COLL VENOUS BLD VENIPUNCTURE: CPT
